# Patient Record
Sex: FEMALE | Race: WHITE | Employment: OTHER | URBAN - METROPOLITAN AREA
[De-identification: names, ages, dates, MRNs, and addresses within clinical notes are randomized per-mention and may not be internally consistent; named-entity substitution may affect disease eponyms.]

---

## 2022-02-16 PROBLEM — K92.1 BLACK STOOL: Status: ACTIVE | Noted: 2022-02-16

## 2022-02-16 PROBLEM — Z01.419 WELL WOMAN EXAM WITH ROUTINE GYNECOLOGICAL EXAM: Status: ACTIVE | Noted: 2022-02-16

## 2022-02-16 PROBLEM — E11.9 DIABETES MELLITUS WITH INSULIN THERAPY (HCC): Status: ACTIVE | Noted: 2022-02-16

## 2022-02-16 PROBLEM — Z00.00 MEDICARE ANNUAL WELLNESS VISIT, INITIAL: Status: ACTIVE | Noted: 2022-02-16

## 2022-02-16 PROBLEM — Z01.00 DIABETIC EYE EXAM (HCC): Status: ACTIVE | Noted: 2022-02-16

## 2022-02-16 PROBLEM — I10 ESSENTIAL HYPERTENSION: Status: ACTIVE | Noted: 2022-02-16

## 2022-02-16 PROBLEM — E11.9 ENCOUNTER FOR DIABETIC FOOT EXAM (HCC): Status: ACTIVE | Noted: 2022-02-16

## 2022-02-16 PROBLEM — Z12.31 VISIT FOR SCREENING MAMMOGRAM: Status: ACTIVE | Noted: 2022-02-16

## 2022-02-16 PROBLEM — Z12.11 COLON CANCER SCREENING: Status: ACTIVE | Noted: 2022-02-16

## 2022-02-16 PROBLEM — Z78.0 POST-MENOPAUSAL: Status: ACTIVE | Noted: 2022-02-16

## 2022-02-16 PROBLEM — Z00.00 HEALTH CARE MAINTENANCE: Status: ACTIVE | Noted: 2022-02-16

## 2022-02-16 PROBLEM — Z79.4 DIABETES MELLITUS WITH INSULIN THERAPY (HCC): Status: ACTIVE | Noted: 2022-02-16

## 2022-02-16 PROBLEM — E11.9 DIABETIC EYE EXAM (HCC): Status: ACTIVE | Noted: 2022-02-16

## 2022-03-17 PROBLEM — K92.1 BLACK STOOL: Status: RESOLVED | Noted: 2022-02-16 | Resolved: 2022-03-17

## 2024-09-05 RX ORDER — ASPIRIN 81 MG/1
TABLET, CHEWABLE ORAL
COMMUNITY

## 2024-09-05 RX ORDER — ROSUVASTATIN CALCIUM 5 MG/1
5 TABLET, COATED ORAL NIGHTLY
COMMUNITY

## 2024-09-05 RX ORDER — LISINOPRIL 40 MG/1
40 TABLET ORAL EVERY MORNING
COMMUNITY

## 2024-09-11 ENCOUNTER — HOSPITAL ENCOUNTER (OUTPATIENT)
Facility: HOSPITAL | Age: 74
Setting detail: HOSPITAL OUTPATIENT SURGERY
Discharge: HOME | End: 2024-09-11
Attending: INTERNAL MEDICINE | Admitting: INTERNAL MEDICINE
Payer: MEDICARE

## 2024-09-11 ENCOUNTER — ANESTHESIA (OUTPATIENT)
Dept: ENDOSCOPY | Facility: HOSPITAL | Age: 74
End: 2024-09-11
Payer: MEDICARE

## 2024-09-11 ENCOUNTER — ANESTHESIA EVENT (OUTPATIENT)
Dept: ENDOSCOPY | Facility: HOSPITAL | Age: 74
End: 2024-09-11
Payer: MEDICARE

## 2024-09-11 VITALS
HEIGHT: 64 IN | DIASTOLIC BLOOD PRESSURE: 58 MMHG | HEART RATE: 68 BPM | OXYGEN SATURATION: 100 % | SYSTOLIC BLOOD PRESSURE: 110 MMHG | RESPIRATION RATE: 16 BRPM | TEMPERATURE: 98 F | WEIGHT: 130 LBS | BODY MASS INDEX: 22.2 KG/M2

## 2024-09-11 LAB — GLUCOSE BLD-MCNC: 139 MG/DL (ref 70–99)

## 2024-09-11 PROCEDURE — 88305 TISSUE EXAM BY PATHOLOGIST: CPT | Performed by: INTERNAL MEDICINE

## 2024-09-11 PROCEDURE — 0DBN8ZX EXCISION OF SIGMOID COLON, VIA NATURAL OR ARTIFICIAL OPENING ENDOSCOPIC, DIAGNOSTIC: ICD-10-PCS | Performed by: INTERNAL MEDICINE

## 2024-09-11 PROCEDURE — 0DB98ZX EXCISION OF DUODENUM, VIA NATURAL OR ARTIFICIAL OPENING ENDOSCOPIC, DIAGNOSTIC: ICD-10-PCS | Performed by: INTERNAL MEDICINE

## 2024-09-11 PROCEDURE — 82962 GLUCOSE BLOOD TEST: CPT

## 2024-09-11 PROCEDURE — 0DB78ZX EXCISION OF STOMACH, PYLORUS, VIA NATURAL OR ARTIFICIAL OPENING ENDOSCOPIC, DIAGNOSTIC: ICD-10-PCS | Performed by: INTERNAL MEDICINE

## 2024-09-11 PROCEDURE — 0DBK8ZX EXCISION OF ASCENDING COLON, VIA NATURAL OR ARTIFICIAL OPENING ENDOSCOPIC, DIAGNOSTIC: ICD-10-PCS | Performed by: INTERNAL MEDICINE

## 2024-09-11 RX ORDER — NICOTINE POLACRILEX 4 MG
15 LOZENGE BUCCAL
Status: DISCONTINUED | OUTPATIENT
Start: 2024-09-11 | End: 2024-09-11

## 2024-09-11 RX ORDER — LIDOCAINE HYDROCHLORIDE 10 MG/ML
INJECTION, SOLUTION EPIDURAL; INFILTRATION; INTRACAUDAL; PERINEURAL AS NEEDED
Status: DISCONTINUED | OUTPATIENT
Start: 2024-09-11 | End: 2024-09-11 | Stop reason: SURG

## 2024-09-11 RX ORDER — NALOXONE HYDROCHLORIDE 0.4 MG/ML
80 INJECTION, SOLUTION INTRAMUSCULAR; INTRAVENOUS; SUBCUTANEOUS AS NEEDED
Status: DISCONTINUED | OUTPATIENT
Start: 2024-09-11 | End: 2024-09-11

## 2024-09-11 RX ORDER — ACETAMINOPHEN 500 MG
1000 TABLET ORAL ONCE AS NEEDED
Status: DISCONTINUED | OUTPATIENT
Start: 2024-09-11 | End: 2024-09-11

## 2024-09-11 RX ORDER — MEPERIDINE HYDROCHLORIDE 25 MG/ML
12.5 INJECTION INTRAMUSCULAR; INTRAVENOUS; SUBCUTANEOUS AS NEEDED
Status: DISCONTINUED | OUTPATIENT
Start: 2024-09-11 | End: 2024-09-11

## 2024-09-11 RX ORDER — LABETALOL HYDROCHLORIDE 5 MG/ML
5 INJECTION, SOLUTION INTRAVENOUS EVERY 5 MIN PRN
Status: DISCONTINUED | OUTPATIENT
Start: 2024-09-11 | End: 2024-09-11

## 2024-09-11 RX ORDER — SODIUM CHLORIDE, SODIUM LACTATE, POTASSIUM CHLORIDE, CALCIUM CHLORIDE 600; 310; 30; 20 MG/100ML; MG/100ML; MG/100ML; MG/100ML
INJECTION, SOLUTION INTRAVENOUS CONTINUOUS
Status: DISCONTINUED | OUTPATIENT
Start: 2024-09-11 | End: 2024-09-11

## 2024-09-11 RX ORDER — HYDROCODONE BITARTRATE AND ACETAMINOPHEN 5; 325 MG/1; MG/1
1 TABLET ORAL ONCE AS NEEDED
Status: DISCONTINUED | OUTPATIENT
Start: 2024-09-11 | End: 2024-09-11

## 2024-09-11 RX ORDER — ONDANSETRON 2 MG/ML
4 INJECTION INTRAMUSCULAR; INTRAVENOUS EVERY 6 HOURS PRN
Status: DISCONTINUED | OUTPATIENT
Start: 2024-09-11 | End: 2024-09-11

## 2024-09-11 RX ORDER — NICOTINE POLACRILEX 4 MG
30 LOZENGE BUCCAL
Status: DISCONTINUED | OUTPATIENT
Start: 2024-09-11 | End: 2024-09-11

## 2024-09-11 RX ORDER — MIDAZOLAM HYDROCHLORIDE 1 MG/ML
1 INJECTION INTRAMUSCULAR; INTRAVENOUS EVERY 5 MIN PRN
Status: DISCONTINUED | OUTPATIENT
Start: 2024-09-11 | End: 2024-09-11

## 2024-09-11 RX ORDER — HYDROMORPHONE HYDROCHLORIDE 1 MG/ML
0.2 INJECTION, SOLUTION INTRAMUSCULAR; INTRAVENOUS; SUBCUTANEOUS EVERY 5 MIN PRN
Status: DISCONTINUED | OUTPATIENT
Start: 2024-09-11 | End: 2024-09-11

## 2024-09-11 RX ORDER — DEXTROSE MONOHYDRATE 25 G/50ML
50 INJECTION, SOLUTION INTRAVENOUS
Status: DISCONTINUED | OUTPATIENT
Start: 2024-09-11 | End: 2024-09-11

## 2024-09-11 RX ORDER — HYDROMORPHONE HYDROCHLORIDE 1 MG/ML
0.6 INJECTION, SOLUTION INTRAMUSCULAR; INTRAVENOUS; SUBCUTANEOUS EVERY 5 MIN PRN
Status: DISCONTINUED | OUTPATIENT
Start: 2024-09-11 | End: 2024-09-11

## 2024-09-11 RX ORDER — HYDROCODONE BITARTRATE AND ACETAMINOPHEN 5; 325 MG/1; MG/1
2 TABLET ORAL ONCE AS NEEDED
Status: DISCONTINUED | OUTPATIENT
Start: 2024-09-11 | End: 2024-09-11

## 2024-09-11 RX ORDER — HYDROMORPHONE HYDROCHLORIDE 1 MG/ML
0.4 INJECTION, SOLUTION INTRAMUSCULAR; INTRAVENOUS; SUBCUTANEOUS EVERY 5 MIN PRN
Status: DISCONTINUED | OUTPATIENT
Start: 2024-09-11 | End: 2024-09-11

## 2024-09-11 RX ORDER — METOCLOPRAMIDE HYDROCHLORIDE 5 MG/ML
10 INJECTION INTRAMUSCULAR; INTRAVENOUS EVERY 8 HOURS PRN
Status: DISCONTINUED | OUTPATIENT
Start: 2024-09-11 | End: 2024-09-11

## 2024-09-11 RX ADMIN — LIDOCAINE HYDROCHLORIDE 25 MG: 10 INJECTION, SOLUTION EPIDURAL; INFILTRATION; INTRACAUDAL; PERINEURAL at 11:12:00

## 2024-09-11 NOTE — OPERATIVE REPORT
EGD/Colonoscopy Operative Report    Tita Jimenez Patient Status:  Hospital Outpatient Surgery    3/21/1950 MRN [id number]   Formerly [facility] Attending Nhan Garcia MD   Hosp Day #   0 PCP Savita Gay MD     Pre-Operative Diagnosis:   Chronic diarrhea   Rectal bleeding  Pelvic floor dysfunction in female  Incontinence of feces with fecal urgency  Type 2 diabetes mellitus with hyperglycemia, with long-term current use of insulin (HCC)  Benign neuroendocrine tumor of duodenum    Post-Operative Diagnosis:  - Normal esophagus.  - Mild-to-moderate patchy chronic-appearing erythema + scattered non-bleeding erosions in the distal gastric body and antrum, stomach otherwise normal. Biopsied from the body and antrum (including the erosions) via cold forceps.  - Mild patchy erythema throughout the duodenum, duodenum otherwise normal. No evidence of an overt recurrent intraluminal/mucosal lesion on this exam. Biopsied via cold forceps.    - Moderate anal sphincter laxity + patency, may be contributing to fecal incontinence.  - Marked pan-colonic redundancy + tortuosity.  - Small non-bleeding Grade 2 internal hemorrhoids.  - Scattered diverticulosis in the ascending and sigmoid colon.  - A focal 5mm inflammatory appearing nodule at the anal verge overlying a hemorrhoid, likely due to recurrent trauma. May need to consider further evaluation by Colorectal Surgery if symptomatic.  - Five 3-6mm sessile polyps in the ascending and sigmoid colon. Resected via cold snare.  - Colon otherwise normal. Biopsied via cold forceps.    Procedure Performed:   EGD   COLONOSCOPY     Informed Consent: Informed consent for both the procedure and sedation were obtained from the patient. The potentially life-threatening complications of sedation, bleeding,  perforation,  transfusion or repeat endoscopy were reviewed along with the possible need for hospitalization, surgical management, transfusion or repeat endoscopy should one of these complications arise. The patient understands and is agreeable to proceed.    Sedation Type: MAC. Patient received sedation with monitored anesthesia provided by an Anesthesiologist.  Moderate Sedation Time: None. Deep sedation provided by Anesthesia.    Cecum Withdrawal Time:  15 minutes    Date of previous colonoscopy: 2022 (poor preparation)    Procedure Description: The patient was placed in the left lateral decubitus position. A bite block was placed in the patient’s mouth. The endoscope was inserted through the mouth and advanced under direct visualization to the 3rd portion of the duodenum and was then withdrawn to examine the duodenal bulb and gastric antrum.  The endoscope was then retroflexed to examine the incisura, GE junction, cardia, body, and fundus, then withdrawn to examine the esophagus. The endoscope was then removed from the patient. The patient tolerated the procedure well with no immediate complications. The patient was then repositioned for colonoscopy.  After careful digital rectal examination, the Pediatric colonoscope was inserted into the rectum and advanced to the level of the cecum under direct visualization. The cecum was identified by landmarks, including the appendiceal orifice and ileocecal valve. Careful examination of the entire colon was performed during withdrawal of the endoscope. The scope was withdrawn to the rectum and retroflexion was performed.  The patient tolerated the procedure well with no immediate complications. The patient was transferred to the recovery area in stable condition.     Quality of Preparation: Adequate    Aronchick Bowel Prep Scale:  1      Complications:  No immediate complications noted.    EGD Findings:  - Normal esophagus.  - Mild-to-moderate patchy chronic-appearing erythema +  scattered non-bleeding erosions in the distal gastric body and antrum, stomach otherwise normal. Biopsied from the body and antrum (including the erosions) via cold forceps.  - Mild patchy erythema throughout the duodenum, duodenum otherwise normal. No evidence of an overt recurrent intraluminal/mucosal lesion on this exam. Biopsied via cold forceps.    COL Findings:  - Moderate anal sphincter laxity + patency, may be contributing to fecal incontinence.  - Marked pan-colonic redundancy + tortuosity.  - Small non-bleeding Grade 2 internal hemorrhoids.  - Scattered diverticulosis in the ascending and sigmoid colon.  - A focal 5mm inflammatory appearing nodule at the anal verge overlying a hemorrhoid, likely due to recurrent trauma. May need to consider further evaluation by Colorectal Surgery if symptomatic.  - Five 3-6mm sessile polyps in the ascending and sigmoid colon. Resected via cold snare.  - Colon otherwise normal. Biopsied via cold forceps.    Recommendations:  - Await pathology results.  - Advise daily fiber supplementation, may need to consider trial of low-dose scheduled Imodium +/- Questran in hopes it may improve symptoms.  - Given the marked anal sphincter laxity, may need to consider role for [facility] referral as well.  - May need to consider Colorectal Surgery referral to evaluate the anal verge lesion further, particularly if symptomatic. Can readdress at next GI Clinic visit.  - Repeat Colonoscopy for surveillance based on pathology results.  - Role for further routine Colonoscopy surveillance should be determined based on care goals + overall health status when due. Please discuss with your PCP accordingly, as further elective surveillance could be deferred.  - Return to GI Clinic as advised.  - Return to primary care provider as advised.  - Findings were discussed with the patient and requested family/acquaintance today following procedure.    Discharge:  The patient was given an after  visit summary detailing the procedure, findings, recommendations and follow up plans.    Nhan Garcia MD  9/11/2024  11:10 AM

## 2024-09-11 NOTE — ANESTHESIA PREPROCEDURE EVALUATION
PRE-OP EVALUATION    Patient Name: Tita Jimenez    Admit Diagnosis: CHRONIC DIARRHEA, RECTAL BLEEDING, INCONTINENCE OF FECES WITH FECAL URGENCY, TYPE 2 DIABETES MELLITUS WITH HYPERGLLLYCEMAI WITH LONG-TERM CURRENT OF INSULIN    Pre-op Diagnosis: CHRONIC DIARRHEA, RECTAL BLEEDING, INCONTINENCE OF FECES WITH FECAL URGENCY, TYPE 2 DIABETES MELLITUS WITH HYPERGLLLYCEMAI WITH LONG-TERM CURRENT OF INSULIN    ESOPHAGOGASTRODUODENOSCOPY (EGD),  COLONOSCOPY    Anesthesia Procedure: ESOPHAGOGASTRODUODENOSCOPY (EGD), COLONOSCOPY  COLONOSCOPY    Surgeons and Role:     * Nhan Garcia MD - Primary    Pre-op vitals reviewed.        Body mass index is 22.31 kg/m².    Current medications reviewed.  Hospital Medications:   glucose (Dex4) 15 GM/59ML oral liquid 15 g  15 g Oral Q15 Min PRN    Or    glucose (Glutose) 40% oral gel 15 g  15 g Oral Q15 Min PRN    Or    glucose-vitamin C (Dex-4) chewable tab 4 tablet  4 tablet Oral Q15 Min PRN    Or    dextrose 50% injection 50 mL  50 mL Intravenous Q15 Min PRN    Or    glucose (Dex4) 15 GM/59ML oral liquid 30 g  30 g Oral Q15 Min PRN    Or    glucose (Glutose) 40% oral gel 30 g  30 g Oral Q15 Min PRN    Or    glucose-vitamin C (Dex-4) chewable tab 8 tablet  8 tablet Oral Q15 Min PRN    lactated ringers infusion   Intravenous Continuous       Outpatient Medications:     Medications Prior to Admission   Medication Sig Dispense Refill Last Dose    lisinopril 40 MG Oral Tab Take 1 tablet (40 mg total) by mouth every morning.       rosuvastatin 5 MG Oral Tab Take 1 tablet (5 mg total) by mouth nightly.       aspirin 81 MG Oral Chew Tab Chew by mouth. 3x/week       amLODIPine 5 MG Oral Tab Take 1 tablet (5 mg total) by mouth daily. 90 tablet 1     glimepiride 4 MG Oral Tab Take 1 tablet (4 mg total) by mouth every morning before breakfast. (Patient taking differently: Take 1 tablet (4 mg total) by mouth in the morning and 1 tablet (4 mg total) before bedtime.) 90 tablet 1     metFORMIN HCl  1000 MG Oral Tab Take 1 tablet (1,000 mg total) by mouth 2 (two) times daily with meals. 180 tablet 1     insulin glargine (LANTUS) 100 UNIT/ML Subcutaneous Solution Inject 35 Units into the skin nightly. (Patient taking differently: Inject 35 Units into the skin nightly. Toujeo) 10 mL 5     Insulin Lispro 100 UNIT/ML Subcutaneous Solution Inject 10 Units into the skin 3 (three) times daily before meals. 3 each 5     TRUEPLUS INSULIN SYRINGE 29G X 1/2\" 0.5 ML Does not apply Misc 1 each daily. 100 each 3     lansoprazole 30 MG Oral Capsule Delayed Release Take 1 capsule (30 mg total) by mouth 2 (two) times daily before meals. 28 capsule 0     Blood Glucose Monitoring Suppl (ONETOUCH VERIO FLEX SYSTEM) w/Device Does not apply Kit 1 each As Directed.       Glucose Blood (ONETOUCH VERIO) In Vitro Strip 1 strip by In Vitro route 3 (three) times daily.       Lancets (ONETOUCH DELICA PLUS [id number]) Does not apply Misc 1 lancet by Finger stick route 3 (three) times daily.          Allergies: Patient has no known allergies.      Anesthesia Evaluation    Patient summary reviewed.    Anesthetic Complications           GI/Hepatic/Renal                                 Cardiovascular                  (+) hypertension                                     Endo/Other      (+) diabetes                            Pulmonary                           Neuro/Psych                              Patient Active Problem List:     Diabetes mellitus with insulin therapy (HCC)     Diabetic eye exam (HCC)     Encounter for diabetic foot exam (HCC)     Essential hypertension     Health care maintenance     Colon cancer screening     Visit for screening mammogram     Well woman exam with routine gynecological exam     Post-menopausal     Medicare annual wellness visit, initial            Past Surgical History:   Procedure Laterality Date    Hernia surgery       Social History     Socioeconomic History    Marital status:    Tobacco Use     Smoking status: Never    Smokeless tobacco: Never   Substance and Sexual Activity    Alcohol use: Never    Drug use: Never     History   Drug Use Unknown     Available pre-op labs reviewed.               Airway      Mallampati: II  Mouth opening: >3 FB  TM distance: > 6 cm  Neck ROM: full Cardiovascular    Cardiovascular exam normal.         Dental    Dentition appears grossly intact         Pulmonary    Pulmonary exam normal.                 Other findings              ASA: 3   Plan: MAC  NPO status verified and patient meets guidelines.        Comment: Plan is MAC anesthesia, which likely will include deep sedation.  Implied that memory of procedure is unlikely although intraop recall, if it occurs, may be a reasonable and comfortable experience with this anesthetic.  Aware that general anesthesia is not intended though deep sedation may include brief moments of general anesthesia.   Questions answered. Accepts. The consent was signed without further questions.     Plan/risks discussed with: patient                Present on Admission:  **None**

## 2024-09-11 NOTE — ANESTHESIA POSTPROCEDURE EVALUATION
[facility]    Tita Duffyzman Patient Status:  Hospital Outpatient Surgery   Age/Gender 74 year old female MRN [id number]   Location [facility] Attending Nhan Garcia MD   Hosp Day # 0 PCP Savita Gay MD       Anesthesia Post-op Note    ESOPHAGOGASTRODUODENOSCOPY (EGD) with biopsy,  COLONOSCOPY with cold snare polypectomy    Procedure Summary       Date: 09/11/24 Room / Location:  ENDOSCOPY 03 / [facility]    Anesthesia Start: 1110 Anesthesia Stop: 1158    Procedures:       ESOPHAGOGASTRODUODENOSCOPY (EGD) with biopsy, COLONOSCOPY with cold snare polypectomy      COLONOSCOPY Diagnosis: (EGD: CHRONIC GASTRITS ANTRUM, DUODENITIS COLON: polyps,)    Surgeons: Nhan Garcia MD Anesthesiologist: Sam Jj MD    Anesthesia Type: MAC ASA Status: 3            Anesthesia Type: MAC    Vitals Value Taken Time   /73 09/11/24 1201   Temp  09/11/24 1201   Pulse 78 09/11/24 1201   Resp 20 09/11/24 1201   SpO2 95 09/11/24 1201       Patient Location: Endoscopy    Anesthesia Type: MAC    Airway Patency: patent    Postop Pain Control: adequate    Mental Status: mildly sedated but able to meaningfully participate in the post-anesthesia evaluation    Nausea/Vomiting: none    Cardiopulmonary/Hydration status: stable euvolemic    Complications: no apparent anesthesia related complications    Postop vital signs: stable    Dental Exam: Unchanged from Preop    Patient to be discharged home when criteria met.

## 2024-09-11 NOTE — H&P
History and Physical for Endoscopic Procedure      Tita Jimenez Patient Status:  Hospital Outpatient Surgery    3/21/1950 MRN [id number]   Formerly [facility] Attending Nhan Garcia MD   Hosp Day # 0 PCP Savita Gay MD     Date of Consult:  2024     Reason for Consultation:  Chronic diarrhea   Rectal bleeding  Pelvic floor dysfunction in female  Incontinence of feces with fecal urgency  Type 2 diabetes mellitus with hyperglycemia, with long-term current use of insulin ([facility])  Benign neuroendocrine tumor of duodenum    History of Present Illness:  Tita Jimenez is a a(n) 74 year old female.     History:  Past Medical History:    Diabetes ([facility])    High blood pressure    High cholesterol    Neuropathy    bilateral feet    Osteoarthritis    fingers/hands     Past Surgical History:   Procedure Laterality Date    Hernia surgery       History reviewed. No pertinent family history.   reports that she has never smoked. She has never used smokeless tobacco. She reports that she does not drink alcohol and does not use drugs.    Allergies:  No Known Allergies    Medications:  No current facility-administered medications for this encounter.    Review of Systems:  Gastrointestinal: negative other than specified in the HPI  General: negative other than specified in the HPI  Neurological: negative other than specified in the HPI  Cardiovascular: negative other than specified in the HPI  Respiratory: negative other than specified in the HPI    Physical Exam:  No acute distress  RRR  CTA B/L  SOFT +BS    Assessment/Plan:  Patient Active Problem List   Diagnosis    Diabetes mellitus with insulin therapy ([facility])    Diabetic eye exam ([facility])    Encounter for diabetic foot exam ([facility])    Essential hypertension    Health care maintenance    Colon cancer screening    Visit for screening mammogram    Well woman exam with routine gynecological exam    Post-menopausal    Medicare annual wellness visit, initial        EGD+COL today    Nhan Garcia MD  9/11/2024  7:59 AM

## 2024-09-11 NOTE — DISCHARGE INSTRUCTIONS
ENDOSCOPY DISCHARGE INSTRUCTIONS    Procedure Performed:   Gastroscopy and Colonoscopy    Endoscopist: Nhan Garcia MD    Findings:  EGD Findings:  - Normal esophagus.  - Mild-to-moderate patchy chronic-appearing erythema + scattered non-bleeding erosions in the distal gastric body and antrum, stomach otherwise normal. Biopsied from the body and antrum (including the erosions) via cold forceps.  - Mild patchy erythema throughout the duodenum, duodenum otherwise normal. No evidence of an overt recurrent intraluminal/mucosal lesion on this exam. Biopsied via cold forceps.    COL Findings:  - Moderate anal sphincter laxity + patency, may be contributing to fecal incontinence.  - Marked pan-colonic redundancy + tortuosity.  - Small non-bleeding Grade 2 internal hemorrhoids.  - Scattered diverticulosis in the ascending and sigmoid colon.  - A focal 5mm inflammatory appearing nodule at the anal verge overlying a hemorrhoid, likely due to recurrent trauma. May need to consider further evaluation by Colorectal Surgery if symptomatic.  - Five 3-6mm sessile polyps in the ascending and sigmoid colon. Resected via cold snare.  - Colon otherwise normal. Biopsied via cold forceps.    Recommendations:  - Await pathology results.  - Advise daily fiber supplementation, may need to consider trial of low-dose scheduled Imodium +/- Questran in hopes it may improve symptoms.  - Given the marked anal sphincter laxity, may need to consider role for [facility] referral as well.  - May need to consider Colorectal Surgery referral to evaluate the anal verge lesion further, particularly if symptomatic. Can readdress at next GI Clinic visit.  - Repeat Colonoscopy for surveillance based on pathology results.  - Role for further routine Colonoscopy surveillance should be determined based on care goals + overall health status when due. Please discuss with your PCP accordingly, as further elective surveillance could be deferred.  -  Return to GI Clinic as advised.  - Return to primary care provider as advised.  - Findings were discussed with the patient and requested family/acquaintance today following procedure.      MEDICATIONS:  You may resume all other medications today    DIET:  Regular    BIOPSIES:  Biopsies were taken (you will be notified of results in 7-10 days)    X-RAYS:   No X-Rays were ordered today        Activity for remainder of today:    REST TODAY  DO NOT drive or operate heavy machinery  DO NOT drink any alcoholic beverages  DO NOT sign any legal documents or make any important decisions  AVOID NSAIDs if possible for 3-5 days following your procedure.    After your procedure(s):  It is not unusual to feel bloated or gassy .  Passing gas and belching is encouraged. Lying on your left side with your knees flexed may relieve the discomfort. A hot pack to the abdomen may also help.    After your gastroscopy (upper endoscopy): You may experience a slight sore throat which will subside. Throat lozenges or salt water gargle can be used.    FOLLOW-UP:  Contact the office at [phone] for follow-up appointment is needed or if you develop any of the following:    Severe abdominal pain/discomfort     Excessive bleeding                     Black tarry stool    Difficulty breathing/swallowing      Persistent nausea/vomiting  Fever above 100 degrees or chills

## 2024-12-23 ENCOUNTER — LAB REQUISITION (OUTPATIENT)
Dept: LAB | Facility: HOSPITAL | Age: 74
End: 2024-12-23
Payer: MEDICARE

## 2024-12-23 DIAGNOSIS — K62.1 RECTAL POLYP: ICD-10-CM

## 2024-12-23 PROCEDURE — 88305 TISSUE EXAM BY PATHOLOGIST: CPT | Performed by: COLON & RECTAL SURGERY

## (undated) DEVICE — 10FT COMBINED O2 DELIVERY/CO2 MONITORING. FILTER WITH MICROSTREAM TYPE LUER: Brand: DUAL ADULT NASAL CANNULA

## (undated) DEVICE — BITEBLOCK ENDOSCP 60FR MAXI STRP

## (undated) DEVICE — LASSO POLYPECTOMY SNARE: Brand: LASSO

## (undated) DEVICE — 3M™ RED DOT™ MONITORING ELECTRODE WITH FOAM TAPE AND STICKY GEL, 50/BAG, 20/CASE, 72/PLT 2570: Brand: RED DOT™

## (undated) DEVICE — KIT VLV 5 PC AIR H2O SUCT BX ENDOGATOR CONN

## (undated) DEVICE — 1200CC GUARDIAN II: Brand: GUARDIAN

## (undated) DEVICE — GIJAW SINGLE-USE BIOPSY FORCEPS WITH NEEDLE: Brand: GIJAW

## (undated) DEVICE — KIT MFLD FOR SPEC COLL

## (undated) DEVICE — KIT CUSTOM ENDOPROCEDURE STERIS